# Patient Record
Sex: FEMALE | Race: WHITE
[De-identification: names, ages, dates, MRNs, and addresses within clinical notes are randomized per-mention and may not be internally consistent; named-entity substitution may affect disease eponyms.]

---

## 2017-07-19 ENCOUNTER — HOSPITAL ENCOUNTER (OUTPATIENT)
Dept: HOSPITAL 17 - HPND | Age: 27
End: 2017-07-19
Attending: OBSTETRICS & GYNECOLOGY
Payer: COMMERCIAL

## 2017-07-19 DIAGNOSIS — Z36: Primary | ICD-10-CM

## 2017-07-19 PROCEDURE — 76813 OB US NUCHAL MEAS 1 GEST: CPT

## 2017-07-19 PROCEDURE — 36416 COLLJ CAPILLARY BLOOD SPEC: CPT

## 2017-07-25 ENCOUNTER — HOSPITAL ENCOUNTER (EMERGENCY)
Dept: HOSPITAL 17 - NEPD | Age: 27
Discharge: HOME | End: 2017-07-25
Payer: COMMERCIAL

## 2017-07-25 VITALS — OXYGEN SATURATION: 100 % | HEART RATE: 91 BPM | RESPIRATION RATE: 20 BRPM

## 2017-07-25 VITALS
HEART RATE: 122 BPM | RESPIRATION RATE: 21 BRPM | TEMPERATURE: 97.8 F | SYSTOLIC BLOOD PRESSURE: 148 MMHG | DIASTOLIC BLOOD PRESSURE: 86 MMHG | OXYGEN SATURATION: 100 %

## 2017-07-25 VITALS
HEART RATE: 108 BPM | OXYGEN SATURATION: 100 % | DIASTOLIC BLOOD PRESSURE: 66 MMHG | RESPIRATION RATE: 20 BRPM | SYSTOLIC BLOOD PRESSURE: 119 MMHG

## 2017-07-25 VITALS — BODY MASS INDEX: 28.34 KG/M2 | WEIGHT: 176.37 LBS | HEIGHT: 66 IN

## 2017-07-25 DIAGNOSIS — F41.0: ICD-10-CM

## 2017-07-25 DIAGNOSIS — Z3A.14: ICD-10-CM

## 2017-07-25 DIAGNOSIS — Z79.899: ICD-10-CM

## 2017-07-25 DIAGNOSIS — O99.342: Primary | ICD-10-CM

## 2017-07-25 DIAGNOSIS — R06.02: ICD-10-CM

## 2017-07-25 PROCEDURE — 99283 EMERGENCY DEPT VISIT LOW MDM: CPT

## 2017-07-25 PROCEDURE — 93005 ELECTROCARDIOGRAM TRACING: CPT

## 2017-07-25 NOTE — PD
HPI


Chief Complaint:  Pregnancy Related Problem


Time Seen by Provider:  10:17


Travel History


International Travel<30 days:  No


Contact w/Intl Traveler<30days:  No


Traveled to known affect area:  No





History of Present Illness


HPI


28yo F who is [id number] pregnant presents to the ED with c/o episode of feeling 

her heart beat fast and sob yesterday that lasted an hour.  States it happened 

again today and she checked her home pulse ox and her oxygen was good at 97% 

but her heart rate was 120bpm.  Pt states it felt like a panic attack and that 

she has been feeling nervous about her baby since her miscarriage last 

November.  Pt currently denies any chest pain, sob, fever, cough, n/v, 

abdominal pain, vaginal bleeding, weakness or numbness.  Pt called her OB and 

was told to come to the ED.  Pt is saturating at 100% on RA and HR is in the 

80s.





PFSH


Past Medical History


Pregnant?:  Pregnant


LMP:  


:  2


Miscarriage:  1





Social History


Alcohol Use:  No


Tobacco Use:  No


Substance Use:  No





Allergies-Medications


(Allergen,Severity, Reaction):  


Coded Allergies:  


     No Known Allergies (Unverified , 17)


Reported Meds & Prescriptions





Reported Meds & Active Scripts


Active


Reported


Phenergan (Promethazine HCl) 25 Mg Tablet 25 Mg PO ONCE


Prenatal Plus Iron 29-1 mg (Prenatal Vit-Iron Carbonyl) 1 Tab Tab 1 Tab PO DAILY








Review of Systems


Except as stated in HPI:  all other systems reviewed are Neg





Physical Exam


Narrative


GENERAL: 28yo F not in distress.


SKIN: Focused skin assessment warm/dry.


HEAD: Atraumatic. Normocephalic. 


CARDIOVASCULAR: Regular rate and rhythm.  No murmur appreciated.  HR 85bpm.


RESPIRATORY: No accessory muscle use. Clear to auscultation. Breath sounds 

equal bilaterally. Saturating at 100% on RA.


GASTROINTESTINAL: Abdomen soft, non-tender.  No rebound tenderness or guarding.


MUSCULOSKELETAL: No obvious deformities. No clubbing.  No cyanosis.  No edema. 


NEUROLOGICAL: Awake and alert. No obvious cranial nerve deficits.  Motor 

grossly within normal limits. Normal speech.


PSYCHIATRIC: Appropriate mood and affect; insight and judgment normal.





Data


Data


Last Documented VS





Vital Signs








  Date Time  Temp Pulse Resp B/P Pulse Ox O2 Delivery O2 Flow Rate FiO2


 


17 11:41  91 20  100   


 


17 09:45    119/66  Room Air  


 


17 09:27 97.8       








Orders





 Electrocardiogram (17 )


Ed Poc Ultrasound (17 )








[facility]


Medical Decision Making


Medical Screen Exam Complete:  Yes


Emergency Medical Condition:  Yes


Interpretation(s)


EKG: NSR 84bpm.  Normal axis.  No ST segment elevation or depression.


Differential Diagnosis


Panic attack vs. anxiety vs. PE is unlikely


Narrative Course


28yo F with what sounds like a panic attack today.  Discussed the risk and 

benefit of CT angio to r/o PE and given the low suspicion, decided together not 

to do a CT angio.  Pt is requesting that I do a sonogram to make sure the baby 

is ok even though she has no abdominal pain or vaginal bleeding.





Procedures


**Procedure Narrative**


Emergency Department Pelvic ultrasound was performed with patient consent.


The curvilinear probe was used in the transverse and sagittal views within the 

suprapubic region revealing single intrauterine pregnancy.  Fetal heart rate 

was 146.





Diagnosis





 Primary Impression:  


 Panic attack


Patient Instructions:  General Instructions


Departure Forms:  Tests/Procedures





***Additional Instructions:


Please follow up with your OBGYN as an outpatient.  Return to the ED if you 

have any chest pain or sob.


***Med/Other Pt SpecificInfo:  No Change to Meds


Disposition:  01 DISCHARGE HOME


Condition:  Stable








Da SilvaJudy ONEILL 2017 10:35

## 2017-07-26 NOTE — EKG
Date Performed: 07/25/2017       Time Performed: 10:50:26

 

PTAGE:      27 years

 

EKG:      Sinus rhythm 

 

 NORMAL ECG 

 

NO PREVIOUS TRACING            

 

DOCTOR:   Memo Figueroa  Interpretating Date/Time  07/26/2017 09:59:31

## 2017-08-11 ENCOUNTER — HOSPITAL ENCOUNTER (EMERGENCY)
Dept: HOSPITAL 17 - HOBED | Age: 27
Discharge: HOME | End: 2017-08-11
Payer: COMMERCIAL

## 2017-08-11 DIAGNOSIS — Z3A.17: ICD-10-CM

## 2017-08-11 DIAGNOSIS — N89.8: ICD-10-CM

## 2017-08-11 DIAGNOSIS — O26.892: Primary | ICD-10-CM

## 2017-08-11 PROCEDURE — 99283 EMERGENCY DEPT VISIT LOW MDM: CPT

## 2017-08-11 PROCEDURE — 87210 SMEAR WET MOUNT SALINE/INK: CPT

## 2017-08-11 NOTE — PD
HPI


Chief Complaint


Leaking fluid, 17 weeks pregnant


Date Seen:  Aug 11, 2017


Time Seen:  11:45


Travel History


International Travel<30 Days:  No


Contact w/Intl Traveler<30Days:  No


Known Affected Area:  No





History of Present Illness


HPI


Pt is a 28 yo  who presents with c/o leaking fluid vaginally.


EDC is 2018.


Prenatal care with Dr Mei, prenatal previously uncomplicated.


Denies any fevers.


Denies any pain or contractions.


She states she noted staining, clear on her underwear last night, and this has 

continued till this morning.


Para:  0


:  2


Last Menstrual Period:  Aug 11, 2017


Miscarriage:  1


:  0





History


Past Medical History


*** Narrative Medical


Nil significant


Medical History:  Denies Significant Hx





Past Surgical History


Surgical History:  No Previous Surgery





Family History


Family History:  Negative





Social History


Alcohol Use:  No


Tobacco Use:  No


Substance Abuse:  No





Allergies-Medications


(Allergen,Severity, Reaction):  


Coded Allergies:  


     No Known Allergies (Unverified , 17)


Home Meds


Reported Medications


Promethazine (Phenergan)25 Mg Kipcin51 Mg PO ONCE   Ref 0


   17


Prenatal Vit-Iron Carbonyl (Prenatal Plus Iron 29-1 mg)1 Tab Tab1 Tab PO DAILY  

#30 TAB  Ref 0


   16





Review of Systems


Except as stated in HPI:  all other systems reviewed are Neg





Physical Exam


Narrative


GENERAL: Well-nourished, well-developed patient.


SKIN: Warm and dry.


HEAD: Normocephalic and atraumatic.


EYES: No scleral icterus. No injection or drainage. 


ENT: No nasal drainage noted. Mucous membranes pink. Airway patent.


NECK: Supple, trachea midline. No JVD.


CARDIOVASCULAR: Regular rate and rhythm without murmurs, gallops, or rubs. 


RESPIRATORY: Breath sounds equal bilaterally. No accessory muscle use.


BREASTS: Bilateral exam showed no masses , no retractions, no nipple discharge.


ABDOMEN/GI: Abdomen soft, non-tender, bowel sounds present, no rebound, no 

guarding 


   Gravid to [-] weeks size


   Fundal Height: [-]


GENITOURINARY: 


   External Genitalia: intact and normal in appearance


   BUS glands: [-]


   Cervix:  STERILE Speculum exam.


No pool of fluid.No leak with strain.


Thick yellowish discharge noted


Cervix appears closed


Swabs colled for Amniosure and for wet mount





   Dilatation: [-]          


   Effacement: [-]          


   Station: [-]  


   Presentation: [-]        


   Membranes: [intact]


   Uterine Contractions: [none]


FHT's: 


   Category: [-]   


   Baseline: [-]   


   Reactive: [-]   


   Variability: [-]  


   Decels: [-]  


EXTREMITIES: No cyanosis or edema.


BACK: Nontender without obvious deformity. No CVA tenderness.


NEUROLOGICAL: Awake and alert. Motor and sensory grossly within normal limits. 

Five out of 5 muscle strength in all muscle groups. Normal speech.





[facility]


Medical Record Reviewed:  Yes


Interpretation(s)


Amniosure NEGATIVE


Wet mount NEGATIVE


Results discussed with patient


Plan


Discharge home.


to call with any increased vaginal drainage, or bleeding


Diagnosis


Diagnosis:  


 Primary Impression:  


 Vaginal discharge during pregnancy in first trimester


Disposition:  01 DISCHARGE HOME


Condition:  Good








Jesus Alberto Andre MD Aug 11, 2017 11:29

## 2018-01-12 ENCOUNTER — HOSPITAL ENCOUNTER (EMERGENCY)
Dept: HOSPITAL 17 - HOBED | Age: 28
LOS: 1 days | Discharge: HOME | End: 2018-01-13
Payer: COMMERCIAL

## 2018-01-12 DIAGNOSIS — Z3A.38: ICD-10-CM

## 2018-01-12 DIAGNOSIS — O47.1: Primary | ICD-10-CM

## 2018-01-12 PROCEDURE — 59025 FETAL NON-STRESS TEST: CPT

## 2018-01-12 NOTE — PD
HPI


Chief Complaint


38 weeks and 1 day pregnant


Uterine contractions 1 day


Date Seen:  2018


Time Seen:  23:25


Travel History


International Travel<30 Days:  No


Contact w/Intl Traveler<30Days:  No


Known Affected Area:  No





History of Present Illness


HPI


Pt is a 26 yo  at 38 weeks and 1 day


EDC 2018, prenatal care with dr Mei.


Care previously uncomplicated.





Patient reports uterine contractions 1 day


Prior to presenting to ER, contractions were about 5 minutes apart


Active fetal movements.


No vaginal bleeding.


Patient was 3cm dilated in office yesterday.


Weeks Gestation:  38


Para:  0


:  1


Last Menstrual Period:  Aug 11, 2017


Miscarriage:  1





History


Past Medical History


Medical History:  Denies Significant Hx





Obstetric History


Obstetric History


previous 1st trimester miscarriage





Family History


Family History:  Negative





Social History


Alcohol Use:  No


Tobacco Use:  No


Substance Abuse:  No





Allergies-Medications


(Allergen,Severity, Reaction):  


Coded Allergies:  


     No Known Allergies (Unverified , 17)


Home Meds


Reported Medications


Promethazine (Phenergan) 25 Mg Tablet, 25 MG PO ONCE for Nausea/Vomiting, TAB 0 

Refills


   17


Prenatal Vit-Iron Carbonyl (Prenatal Plus Iron 29-1 mg) 1 Tab Tab, 1 TAB PO 

DAILY for Nutritional Supplement, #30 TAB 0 Refills


   16





Review of Systems


Except as stated in HPI:  all other systems reviewed are Neg





Physical Exam


Narrative


GENERAL: Well-nourished, well-developed patient.


SKIN: Warm and dry.


HEAD: Normocephalic and atraumatic.


EYES: No scleral icterus. No injection or drainage. 


ENT: No nasal drainage noted. Mucous membranes pink. Airway patent.


NECK: Supple, trachea midline. No JVD.


CARDIOVASCULAR: Regular rate and rhythm without murmurs, gallops, or rubs. 


RESPIRATORY: Breath sounds equal bilaterally. No accessory muscle use.


BREASTS: Bilateral exam showed no masses , no retractions, no nipple discharge.


ABDOMEN/GI: Abdomen soft, non-tender, bowel sounds present, no rebound, no 

guarding 


   Gravid to [38] weeks size


   Fundal Height: [38]


GENITOURINARY: 


   External Genitalia: intact and normal in appearance


   BUS glands: [wnl]


   Cervix: [moderate]


   Dilatation: [2-3cm]  posterior        


   Effacement: [50%]          


   Station: [-3]  


   Presentation: [vertex]        


   Membranes: [intact]


   Uterine Contractions: [10 minutes]


FHT's: 


   Category: [1]   


   Baseline: [130s]   


   Reactive: [-]   


   Variability: [moderate]  


   Decels: [none]  


EXTREMITIES: No cyanosis or edema.


BACK: Nontender without obvious deformity. No CVA tenderness.


NEUROLOGICAL: Awake and alert. Motor and sensory grossly within normal limits. 

Five out of 5 muscle strength in all muscle groups. Normal speech.





Data


Data


Vital Signs Reviewed:  Yes





[facility]


Medical Record Reviewed:  Yes


Plan


26 yo  at 38 weeks and 1 day IUP.


Presents with uterine contractions every 5 minutes for about 1 hour at home.


Contractions now spacing out.


Unchanged from office exam.


Fetal status reassuring


Will recheck in 1 hour.





Cervix unchanged after 1 hour.


Patient declines offer of PO Vistaril.


Labor precautions emphasized.


Diagnosis


Diagnosis:  


 Primary Impression:  


 Pregnancy with 38 completed weeks gestation


 Additional Impression:  


 False labor after 37 weeks of gestation without delivery


Disposition:  01 DISCHARGE HOME


Condition:  Good











Jesus Alberto Andre MD 2018 23:14

## 2018-01-16 ENCOUNTER — HOSPITAL ENCOUNTER (EMERGENCY)
Dept: HOSPITAL 17 - HOBED | Age: 28
LOS: 1 days | Discharge: HOME | End: 2018-01-17
Payer: COMMERCIAL

## 2018-01-16 DIAGNOSIS — O47.1: Primary | ICD-10-CM

## 2018-01-16 DIAGNOSIS — Z3A.39: ICD-10-CM

## 2018-01-16 PROCEDURE — 99283 EMERGENCY DEPT VISIT LOW MDM: CPT

## 2018-01-16 PROCEDURE — 84112 EVAL AMNIOTIC FLUID PROTEIN: CPT

## 2018-01-17 NOTE — PD
HPI


Chief Complaint


Contractions


Date Seen:  2018


Time Seen:  00:08


Travel History


International Travel<30 Days:  No


Contact w/Intl Traveler<30Days:  No


Known Affected Area:  No





History of Present Illness


HPI


27-year-old  who is at 39 weeks 1 day comes in with contractions since 8 

PM.  They are irregular and patient states that she's had contractions similar 

to this episode several times and has become somewhat frustrating.  Patient was 

in here last week with contractions and she was noted to be 3 cm but did not 

change and in the office last Thursday she was still 3 cm.  Patient had some 

watery vaginal discharge and she also wanted to ensure that she did not rupture 

her membranes.  Patient denies any antepartum complications





History


Past Medical History


Medical History:  Denies Significant Hx





Family History


Family History:  Negative





Social History


Alcohol Use:  No


Tobacco Use:  No


Substance Abuse:  No





Allergies-Medications


(Allergen,Severity, Reaction):  


Coded Allergies:  


     No Known Allergies (Unverified , 17)


Home Meds


Reported Medications


Promethazine (Phenergan) 25 Mg Tablet, 25 MG PO ONCE for Nausea/Vomiting, TAB 0 

Refills


   17


Prenatal Vit-Iron Carbonyl (Prenatal Plus Iron 29-1 mg) 1 Tab Tab, 1 TAB PO 

DAILY for Nutritional Supplement, #30 TAB 0 Refills


   16





Review of Systems


Except as stated in HPI:  all other systems reviewed are Neg





Physical Exam


Narrative


GENERAL: Well-nourished, well-developed patient.


SKIN: Warm and dry.


HEAD: Normocephalic and atraumatic.


EYES: No scleral icterus. No injection or drainage. 


ENT: No nasal drainage noted. Mucous membranes pink. Airway patent.


NECK: Supple, trachea midline. No JVD.


CARDIOVASCULAR: Regular rate and rhythm without murmurs, gallops, or rubs. 


RESPIRATORY: Breath sounds equal bilaterally. No accessory muscle use.


ABDOMEN/GI: Abdomen soft, non-tender, bowel sounds present, no rebound, no 

guarding 


   Gravid to [38-] weeks size


   Fundal Height: [-]


GENITOURINARY: 


   External Genitalia: intact and normal in appearance


   BUS glands: [-] Normal


   Cervix: [-] Posterior


   Dilatation: [-] 3         


   Effacement: [-] 50          


   Station: [-] -2 


   Presentation: [-] Vertex        


   Membranes: [intact or ruptured] intact with negative amnisure


   Uterine Contractions: [-] Irregular every 8-10


FHT's: 


   Category: [-] 1   


   Baseline: [-] 140   


   Reactive: [-] Moderate  


   Variability: [-] Moderate  


   Decels: [-] Absent 


EXTREMITIES: No cyanosis or edema.


BACK: Nontender without obvious deformity. No CVA tenderness.


NEUROLOGICAL: Awake and alert. Motor and sensory grossly within normal limits. 

Five out of 5 muscle strength in all muscle groups. Normal speech.





Data


Data


Vital Signs Reviewed:  Yes





[facility]


Medical Record Reviewed:  Yes


Plan


27-year-old  at 39 weeks 1 day with false labor and no cervical change 

since last week


Amnisure negative


Follow-up with OB provider as scheduled


Diagnosis


Diagnosis:  


 Primary Impression:  


 39 weeks gestation of pregnancy


 Additional Impression:  


 False labor after 37 completed weeks of gestation


Disposition:  01 DISCHARGE HOME











Kaycee Davila MD 2018 00:12

## 2018-01-18 ENCOUNTER — HOSPITAL ENCOUNTER (INPATIENT)
Dept: HOSPITAL 17 - HOBED | Age: 28
LOS: 2 days | Discharge: HOME | End: 2018-01-20
Attending: OBSTETRICS & GYNECOLOGY | Admitting: OBSTETRICS & GYNECOLOGY
Payer: COMMERCIAL

## 2018-01-18 VITALS — HEART RATE: 78 BPM

## 2018-01-18 VITALS — SYSTOLIC BLOOD PRESSURE: 115 MMHG | DIASTOLIC BLOOD PRESSURE: 70 MMHG | HEART RATE: 87 BPM

## 2018-01-18 VITALS — DIASTOLIC BLOOD PRESSURE: 69 MMHG | HEART RATE: 82 BPM | SYSTOLIC BLOOD PRESSURE: 114 MMHG

## 2018-01-18 VITALS — DIASTOLIC BLOOD PRESSURE: 75 MMHG | SYSTOLIC BLOOD PRESSURE: 114 MMHG | HEART RATE: 95 BPM

## 2018-01-18 VITALS — SYSTOLIC BLOOD PRESSURE: 107 MMHG | HEART RATE: 93 BPM | DIASTOLIC BLOOD PRESSURE: 73 MMHG

## 2018-01-18 VITALS — HEART RATE: 92 BPM | OXYGEN SATURATION: 100 %

## 2018-01-18 VITALS — RESPIRATION RATE: 15 BRPM

## 2018-01-18 VITALS — SYSTOLIC BLOOD PRESSURE: 111 MMHG | HEART RATE: 86 BPM | DIASTOLIC BLOOD PRESSURE: 69 MMHG

## 2018-01-18 VITALS — HEART RATE: 93 BPM

## 2018-01-18 VITALS — HEART RATE: 85 BPM | DIASTOLIC BLOOD PRESSURE: 75 MMHG | SYSTOLIC BLOOD PRESSURE: 111 MMHG

## 2018-01-18 VITALS — HEART RATE: 94 BPM | DIASTOLIC BLOOD PRESSURE: 74 MMHG | SYSTOLIC BLOOD PRESSURE: 109 MMHG

## 2018-01-18 VITALS — HEART RATE: 84 BPM

## 2018-01-18 VITALS — HEART RATE: 75 BPM

## 2018-01-18 VITALS — HEART RATE: 89 BPM | DIASTOLIC BLOOD PRESSURE: 67 MMHG | SYSTOLIC BLOOD PRESSURE: 110 MMHG

## 2018-01-18 VITALS — RESPIRATION RATE: 15 BRPM | TEMPERATURE: 97.7 F

## 2018-01-18 VITALS — SYSTOLIC BLOOD PRESSURE: 111 MMHG | DIASTOLIC BLOOD PRESSURE: 60 MMHG | HEART RATE: 84 BPM

## 2018-01-18 VITALS — HEART RATE: 89 BPM

## 2018-01-18 VITALS — SYSTOLIC BLOOD PRESSURE: 111 MMHG | HEART RATE: 85 BPM | DIASTOLIC BLOOD PRESSURE: 66 MMHG

## 2018-01-18 VITALS — SYSTOLIC BLOOD PRESSURE: 114 MMHG | HEART RATE: 93 BPM | DIASTOLIC BLOOD PRESSURE: 67 MMHG

## 2018-01-18 VITALS — HEART RATE: 91 BPM | OXYGEN SATURATION: 100 %

## 2018-01-18 VITALS — HEIGHT: 66 IN | BODY MASS INDEX: 32.6 KG/M2 | WEIGHT: 202.83 LBS

## 2018-01-18 VITALS — OXYGEN SATURATION: 100 % | HEART RATE: 102 BPM

## 2018-01-18 VITALS — HEART RATE: 82 BPM | SYSTOLIC BLOOD PRESSURE: 107 MMHG | DIASTOLIC BLOOD PRESSURE: 66 MMHG

## 2018-01-18 VITALS — HEART RATE: 76 BPM

## 2018-01-18 VITALS — HEART RATE: 86 BPM | DIASTOLIC BLOOD PRESSURE: 68 MMHG | SYSTOLIC BLOOD PRESSURE: 111 MMHG

## 2018-01-18 VITALS — SYSTOLIC BLOOD PRESSURE: 113 MMHG | HEART RATE: 85 BPM | DIASTOLIC BLOOD PRESSURE: 71 MMHG

## 2018-01-18 VITALS — HEART RATE: 89 BPM | OXYGEN SATURATION: 100 %

## 2018-01-18 VITALS — HEART RATE: 97 BPM | DIASTOLIC BLOOD PRESSURE: 71 MMHG | SYSTOLIC BLOOD PRESSURE: 113 MMHG

## 2018-01-18 VITALS — SYSTOLIC BLOOD PRESSURE: 117 MMHG | HEART RATE: 75 BPM | DIASTOLIC BLOOD PRESSURE: 64 MMHG

## 2018-01-18 VITALS — HEART RATE: 112 BPM | OXYGEN SATURATION: 100 %

## 2018-01-18 VITALS — HEART RATE: 95 BPM | SYSTOLIC BLOOD PRESSURE: 106 MMHG | DIASTOLIC BLOOD PRESSURE: 60 MMHG

## 2018-01-18 VITALS — OXYGEN SATURATION: 100 % | HEART RATE: 97 BPM

## 2018-01-18 VITALS — HEART RATE: 83 BPM | OXYGEN SATURATION: 100 %

## 2018-01-18 VITALS — DIASTOLIC BLOOD PRESSURE: 76 MMHG | HEART RATE: 89 BPM | OXYGEN SATURATION: 100 % | SYSTOLIC BLOOD PRESSURE: 119 MMHG

## 2018-01-18 VITALS — HEART RATE: 82 BPM

## 2018-01-18 VITALS
SYSTOLIC BLOOD PRESSURE: 117 MMHG | DIASTOLIC BLOOD PRESSURE: 66 MMHG | RESPIRATION RATE: 20 BRPM | HEART RATE: 90 BPM | TEMPERATURE: 97.5 F

## 2018-01-18 VITALS — TEMPERATURE: 98 F | RESPIRATION RATE: 18 BRPM

## 2018-01-18 VITALS — DIASTOLIC BLOOD PRESSURE: 70 MMHG | HEART RATE: 86 BPM | SYSTOLIC BLOOD PRESSURE: 115 MMHG

## 2018-01-18 VITALS — RESPIRATION RATE: 18 BRPM | TEMPERATURE: 98.7 F

## 2018-01-18 VITALS — HEART RATE: 78 BPM | SYSTOLIC BLOOD PRESSURE: 114 MMHG | DIASTOLIC BLOOD PRESSURE: 69 MMHG

## 2018-01-18 VITALS — HEART RATE: 88 BPM | SYSTOLIC BLOOD PRESSURE: 114 MMHG | DIASTOLIC BLOOD PRESSURE: 74 MMHG

## 2018-01-18 VITALS — SYSTOLIC BLOOD PRESSURE: 109 MMHG | HEART RATE: 86 BPM | DIASTOLIC BLOOD PRESSURE: 72 MMHG | OXYGEN SATURATION: 100 %

## 2018-01-18 VITALS — OXYGEN SATURATION: 100 % | HEART RATE: 88 BPM

## 2018-01-18 VITALS — HEART RATE: 90 BPM

## 2018-01-18 VITALS — RESPIRATION RATE: 14 BRPM

## 2018-01-18 VITALS — SYSTOLIC BLOOD PRESSURE: 108 MMHG | DIASTOLIC BLOOD PRESSURE: 69 MMHG | HEART RATE: 86 BPM

## 2018-01-18 VITALS — HEART RATE: 83 BPM

## 2018-01-18 VITALS — OXYGEN SATURATION: 100 % | HEART RATE: 77 BPM

## 2018-01-18 VITALS — RESPIRATION RATE: 19 BRPM

## 2018-01-18 VITALS — SYSTOLIC BLOOD PRESSURE: 111 MMHG | DIASTOLIC BLOOD PRESSURE: 71 MMHG | HEART RATE: 83 BPM

## 2018-01-18 VITALS — DIASTOLIC BLOOD PRESSURE: 69 MMHG | SYSTOLIC BLOOD PRESSURE: 117 MMHG | HEART RATE: 86 BPM

## 2018-01-18 VITALS — SYSTOLIC BLOOD PRESSURE: 121 MMHG | HEART RATE: 81 BPM | DIASTOLIC BLOOD PRESSURE: 67 MMHG

## 2018-01-18 VITALS — HEART RATE: 87 BPM

## 2018-01-18 VITALS — HEART RATE: 74 BPM | DIASTOLIC BLOOD PRESSURE: 62 MMHG | SYSTOLIC BLOOD PRESSURE: 114 MMHG

## 2018-01-18 VITALS — HEART RATE: 79 BPM

## 2018-01-18 VITALS — SYSTOLIC BLOOD PRESSURE: 136 MMHG | HEART RATE: 111 BPM | DIASTOLIC BLOOD PRESSURE: 70 MMHG

## 2018-01-18 VITALS — OXYGEN SATURATION: 100 % | HEART RATE: 90 BPM

## 2018-01-18 VITALS — SYSTOLIC BLOOD PRESSURE: 117 MMHG | DIASTOLIC BLOOD PRESSURE: 73 MMHG | HEART RATE: 90 BPM

## 2018-01-18 VITALS — DIASTOLIC BLOOD PRESSURE: 73 MMHG | SYSTOLIC BLOOD PRESSURE: 110 MMHG | HEART RATE: 84 BPM

## 2018-01-18 VITALS — RESPIRATION RATE: 18 BRPM

## 2018-01-18 VITALS — DIASTOLIC BLOOD PRESSURE: 67 MMHG | SYSTOLIC BLOOD PRESSURE: 96 MMHG | HEART RATE: 85 BPM

## 2018-01-18 VITALS — TEMPERATURE: 98.3 F | RESPIRATION RATE: 18 BRPM

## 2018-01-18 VITALS — HEART RATE: 74 BPM

## 2018-01-18 VITALS — DIASTOLIC BLOOD PRESSURE: 69 MMHG | SYSTOLIC BLOOD PRESSURE: 111 MMHG | HEART RATE: 95 BPM

## 2018-01-18 DIAGNOSIS — O47.1: ICD-10-CM

## 2018-01-18 DIAGNOSIS — Z3A.39: ICD-10-CM

## 2018-01-18 LAB
BASOPHILS # BLD AUTO: 0 TH/MM3 (ref 0–0.2)
BASOPHILS NFR BLD: 0.2 % (ref 0–2)
COLOR UR: YELLOW
EOSINOPHIL # BLD: 0 TH/MM3 (ref 0–0.4)
EOSINOPHIL NFR BLD: 0.3 % (ref 0–4)
ERYTHROCYTE [DISTWIDTH] IN BLOOD BY AUTOMATED COUNT: 15.8 % (ref 11.6–17.2)
GLUCOSE UR STRIP-MCNC: (no result) MG/DL
HCT VFR BLD CALC: 34.5 % (ref 35–46)
HGB BLD-MCNC: 11.4 GM/DL (ref 11.6–15.3)
HGB UR QL STRIP: (no result)
KETONES UR STRIP-MCNC: (no result) MG/DL
LYMPHOCYTES # BLD AUTO: 1.7 TH/MM3 (ref 1–4.8)
LYMPHOCYTES NFR BLD AUTO: 16.1 % (ref 9–44)
MCH RBC QN AUTO: 26.1 PG (ref 27–34)
MCHC RBC AUTO-ENTMCNC: 33 % (ref 32–36)
MCV RBC AUTO: 79.2 FL (ref 80–100)
MONOCYTE #: 0.6 TH/MM3 (ref 0–0.9)
MONOCYTES NFR BLD: 5.4 % (ref 0–8)
MUCOUS THREADS #/AREA URNS LPF: (no result) /LPF
NEUTROPHILS # BLD AUTO: 8.3 TH/MM3 (ref 1.8–7.7)
NEUTROPHILS NFR BLD AUTO: 78 % (ref 16–70)
NITRITE UR QL STRIP: (no result)
PLATELET # BLD: 316 TH/MM3 (ref 150–450)
PMV BLD AUTO: 8.1 FL (ref 7–11)
RBC # BLD AUTO: 4.36 MIL/MM3 (ref 4–5.3)
SP GR UR STRIP: 1.01 (ref 1–1.03)
SQUAMOUS #/AREA URNS HPF: 2 /HPF (ref 0–5)
URINE LEUKOCYTE ESTERASE: (no result)
WBC # BLD AUTO: 10.6 TH/MM3 (ref 4–11)

## 2018-01-18 PROCEDURE — 0UQGXZZ REPAIR VAGINA, EXTERNAL APPROACH: ICD-10-PCS | Performed by: OBSTETRICS & GYNECOLOGY

## 2018-01-18 PROCEDURE — 00HU33Z INSERTION OF INFUSION DEVICE INTO SPINAL CANAL, PERCUTANEOUS APPROACH: ICD-10-PCS

## 2018-01-18 PROCEDURE — 90715 TDAP VACCINE 7 YRS/> IM: CPT

## 2018-01-18 PROCEDURE — 86900 BLOOD TYPING SEROLOGIC ABO: CPT

## 2018-01-18 PROCEDURE — 85025 COMPLETE CBC W/AUTO DIFF WBC: CPT

## 2018-01-18 PROCEDURE — 86901 BLOOD TYPING SEROLOGIC RH(D): CPT

## 2018-01-18 PROCEDURE — 81001 URINALYSIS AUTO W/SCOPE: CPT

## 2018-01-18 PROCEDURE — 84112 EVAL AMNIOTIC FLUID PROTEIN: CPT

## 2018-01-18 PROCEDURE — 59025 FETAL NON-STRESS TEST: CPT

## 2018-01-18 PROCEDURE — 80307 DRUG TEST PRSMV CHEM ANLYZR: CPT

## 2018-01-18 PROCEDURE — 3E0R3BZ INTRODUCTION OF ANESTHETIC AGENT INTO SPINAL CANAL, PERCUTANEOUS APPROACH: ICD-10-PCS

## 2018-01-18 RX ADMIN — OXYTOCIN SCH MLS/HR: 10 INJECTION, SOLUTION INTRAMUSCULAR; INTRAVENOUS at 11:09

## 2018-01-18 RX ADMIN — OXYTOCIN SCH MLS/HR: 10 INJECTION, SOLUTION INTRAMUSCULAR; INTRAVENOUS at 19:11

## 2018-01-18 RX ADMIN — Medication SCH ML: at 21:11

## 2018-01-18 NOTE — PD
HPI


Chief Complaint


LOF


Travel History


International Travel<30 Days:  No


Contact w/Intl Traveler<30Days:  No


Known Affected Area:  No





History of Present Illness


HPI


28y/o , IUP at 39.2





PNC uncomplicated per patient report





Patient presents c/o gush of clear fluid at 8:30am followed by another gush of 

fluid after arrival in the parking lot and continues to leak since arriving. 

She reports irregular contractions but denies regular or painful retractions. 

She reports good FM. She denies any VB. She has no other complaints or concerns 

today.


Weeks Gestation:  39


Para:  0


:  2





History


Past Medical History


Medical History:  Denies Significant Hx





Obstetric History


Obstetric History


, SAB 1





Past Surgical History


Surgical History:  No Previous Surgery





Family History


*** Narrative Family History


Hypothyroidism, CVA





Social History


Alcohol Use:  No


Tobacco Use:  No


Substance Abuse:  No





Allergies-Medications


(Allergen,Severity, Reaction):  


Coded Allergies:  


     No Known Allergies (Unverified , 17)


Home Meds


Reported Medications


Promethazine (Phenergan) 25 Mg Tablet, 25 MG PO ONCE for Nausea/Vomiting, TAB 0 

Refills


   17


Prenatal Vit-Iron Carbonyl (Prenatal Plus Iron 29-1 mg) 1 Tab Tab, 1 TAB PO 

DAILY for Nutritional Supplement, #30 TAB 0 Refills


   16





Review of Systems


Except as stated in HPI:  all other systems reviewed are Neg





Physical Exam


Narrative


GENERAL: Well-nourished, well-developed patient.


SKIN: Warm and dry.


HEAD: Normocephalic and atraumatic.


EYES: No scleral icterus. No injection or drainage. 


ENT: No nasal drainage noted. Mucous membranes pink. Airway patent.


NECK: Supple, trachea midline. No JVD.


CARDIOVASCULAR: Regular rate and rhythm without murmurs, gallops, or rubs. 


RESPIRATORY: Breath sounds equal bilaterally. No accessory muscle use.


BREASTS: Deferred


ABDOMEN/GI: Abdomen soft, non-tender, bowel sounds present, no rebound, no 

guarding 


   Gravid 


GENITOURINARY: 


   External Genitalia: intact and normal in appearance.  Grossly normal BUS, 

grossly normal rugae.  Patient appears to have grossly ruptured membranes.  SVE 

4-5/70/-1 as per RN.  Cephalic as per RN


FHT's: Fetal heart tones are in the 140s with moderate long-term variability, 

good accelerations, no decelerations.  This is a category 1 fetal heart rate 

tracing and reactive NST


EXTREMITIES: No cyanosis or edema.


BACK: Nontender without obvious deformity. 


NEUROLOGICAL: Awake and alert. Motor and sensory grossly within normal limits. 

Five out of 5 muscle strength in all muscle groups. Normal speech.


Psychiatric: Grossly normal memory and affect


Muscle skeletal: Grossly normal range of motion, gait, muscle strength





Data


Data


Orders





 Orders


Ob (2e) Additional Admit Info (18 10:25)





MDM


Narrative Course / MDM


Assessment/plan: 27-year-old 


1.  IUP at 39.2


2.  SROM: The patient is grossly ruptured membranes and may be entering labor 

with a cervical exam of 4 cm.  Dr. Lopez was notified and the patient will be 

admitted to Dr. Mei.  Initial labor orders were placed and discussed in brief 

the patient risks of /risks and indications for  delivery.


3.  Fetal well-being: Reassuring  testing with category 1 fetal heart 

rate tracing and reactive NST


4.  GBS negative











Smiley Amato MD 2018 10:30

## 2018-01-18 NOTE — PD.LABORPN
Subjective


Subjective


doing well





Objective


Vital Signs





Vital Signs








  Date Time  Temp Pulse Resp B/P (MAP) Pulse Ox O2 Delivery O2 Flow Rate FiO2


 


1/18/18 17:25  74      


 


1/18/18 17:20  75      


 


1/18/18 17:15  83      


 


1/18/18 17:10  75      


 


1/18/18 17:05  86  115/70 (85)    


 


1/18/18 17:05  72      


 


1/18/18 17:00  84      


 


1/18/18 16:55  85  96/67 (77)    


 


1/18/18 16:55  82      


 


1/18/18 16:51  74  114/62 (79)    


 


1/18/18 16:50  90      


 


1/18/18 16:45  75      


 


1/18/18 16:45  87  117/64 (81)    


 


1/18/18 16:40  85  111/66 (81)    


 


1/18/18 16:40  85      


 


1/18/18 16:35  95  111/69 (83)    


 


1/18/18 16:35  93      


 


1/18/18 16:30  87  115/70 (85)    


 


1/18/18 16:30  88      


 


1/18/18 16:25  86  117/73 (88)    


 


1/18/18 16:25  90      


 


1/18/18 16:20  85  106/60 (75)    


 


1/18/18 16:20  95      


 


1/18/18 16:19  84  110/73 (85)    


 


1/18/18 16:15  89      


 


1/18/18 16:05   18     


 


1/18/18 16:00  83  111/71 (84)    


 


1/18/18 15:30  88  114/74 (87)    


 


1/18/18 15:00  94  109/74 (86)    


 


1/18/18 14:30  85  111/75 (87)    


 


1/18/18 14:00  82  114/69 (84)    


 


1/18/18 13:43 98.7  18     


 


1/18/18 13:30  85  113/71 (85)    


 


1/18/18 13:00  86  111/68 (82)    


 


1/18/18 12:30  95  114/75 (88)    


 


1/18/18 12:00  89  110/67 (81)    


 


1/18/18 11:30  93  107/73 (84)    


 


1/18/18 10:47  86  117/69 (85)    








Objective


Pelvic Exam:   


   Cervix: [-]


   Dilatation: 5          


   Effacement: [-]          


   Station: [-]  


   Presentation: [-]        


   Membranes: SROM


   Uterine Contractions: [-]


FHT's: 


   Category: 1   


   Baseline: [-]   


   Reactive: [-]   


   Variability: [-]  


   Decels: [-]


Weeks Gestation:  39


Gest Age Assessed Date:  Jan 18, 2018


Pt started active labor?:  Yes


Active labor start date:  Jan 18, 2018


Active labor start time:  08:30


Medical induction of labor?:  No


Artificial rupture of membrane:  No





Assessment/Plan


Problem List:  


(1) 39 weeks gestation of pregnancy


ICD Codes:  Z3A.39 - 39 weeks gestation of pregnancy


Assessment and Plan


doing well











Tan Lopez MD Jan 18, 2018 17:43

## 2018-01-18 NOTE — PD.LABORPN
Subjective


Subjective


doing well no CTX





Objective


Vital Signs





Vital Signs








  Date Time  Temp Pulse Resp B/P (MAP) Pulse Ox O2 Delivery O2 Flow Rate FiO2


 


1/18/18 12:30  95  114/75 (88)    


 


1/18/18 12:00  89  110/67 (81)    


 


1/18/18 11:30  93  107/73 (84)    


 


1/18/18 10:47  86  117/69 (85)    








Objective


Pelvic Exam:   


   Cervix: [-]


   Dilatation: 4-5        


   Effacement: [-]          


   Station: [-]  


   Presentation: [-]        


   Membranes: SROM at 8:30


   Uterine Contractions: [-]


FHT's: 


   Category: 1  


   Baseline: [-]   


   Reactive: [-]   


   Variability: [-]  


   Decels: [-]


Weeks Gestation:  39





Assessment/Plan


Problem List:  


(1) 39 weeks gestation of pregnancy


ICD Codes:  Z3A.39 - 39 weeks gestation of pregnancy











Tan Lopez MD Jan 18, 2018 13:19

## 2018-01-18 NOTE — PD.OB.DELI
Weeks gestation:  39


Gest age assessed date:  2018


Pt started active labor?:  Yes


Active labor start date:  2018


Active labor start time:  08:30


Medical induction of labor?:  No


Artificial rupture of membrane:  No


Anesthesia:  Epidural


Episiotomy:  None


Vaginal Delivery:  Normal, Spontaneous


Presentation:  Occiput anterior


Nuchal Cord:  None


Delayed cord clamping (45 sec):  Yes


Infant:  Male, Single


Delivery date:  2018


Delivery time:  20:10


One Minute APGAR:  8


Five Minute APGAR:  9


Birth Weight:  7# 14 oz


Placenta:  Spontaneous delivery, Intact, 3 vessel cord


Laceration:  Vaginal laceration, 1 deg


Repair:  Chromic interrupted


Estimated blood loss:  300











Tan Lopez MD 2018 20:33

## 2018-01-18 NOTE — HHI.DCPOC
Discharge Care Plan


Diagnosis:  


(1) Spontaneous vaginal delivery


Report Symptoms to Your Doctor


-Temperature above 100.5 degrees


-Redness, of incision or excessive or foul smelling drainage


-Unusual pain or calf pain


-Increased vaginal bleeding


-Painful or difficulty urinating


-Feelings of extreme sadness or anxiety after 2 weeks


Goals to Promote Your Health


* To prevent worsening of your condition and complications


* To maintain your health at the optimal level


Directions to Meet Your Goals


*** Take your medications as prescribed


*** Follow your dietary instruction


*** Follow activity as directed


*** Ensure plenty of rest for recovery


*** Drink fluids for hydration








*** Keep your appointments as scheduled


*** Take your immunizations and boosters as scheduled


*** If your symptoms worsen call your PCP, if no PCP go to Urgent Care Center 

or Emergency Room***


*** Smoking is Dangerous to Your Health. Avoid second hand smoke***


***Call the 24-hour crisis hotline for domestic abuse at 1-808.221.5675***











Tan Lopez MD Jan 18, 2018 20:41

## 2018-01-19 VITALS
OXYGEN SATURATION: 98 % | TEMPERATURE: 97.6 F | DIASTOLIC BLOOD PRESSURE: 60 MMHG | RESPIRATION RATE: 16 BRPM | SYSTOLIC BLOOD PRESSURE: 99 MMHG | HEART RATE: 94 BPM

## 2018-01-19 VITALS
SYSTOLIC BLOOD PRESSURE: 101 MMHG | DIASTOLIC BLOOD PRESSURE: 69 MMHG | OXYGEN SATURATION: 97 % | TEMPERATURE: 98 F | HEART RATE: 87 BPM | RESPIRATION RATE: 18 BRPM

## 2018-01-19 VITALS
TEMPERATURE: 97.7 F | DIASTOLIC BLOOD PRESSURE: 59 MMHG | RESPIRATION RATE: 18 BRPM | SYSTOLIC BLOOD PRESSURE: 102 MMHG | HEART RATE: 82 BPM

## 2018-01-19 RX ADMIN — IBUPROFEN PRN MG: 800 TABLET, FILM COATED ORAL at 08:46

## 2018-01-19 RX ADMIN — IBUPROFEN PRN MG: 800 TABLET, FILM COATED ORAL at 16:23

## 2018-01-19 RX ADMIN — Medication SCH ML: at 16:13

## 2018-01-19 NOTE — HHI.OB
Subjective


Post Partum Day:  1


Remarks


doing well





Objective


Vitals/I&O





Vital Signs








  Date Time  Temp Pulse Resp B/P (MAP) Pulse Ox O2 Delivery O2 Flow Rate FiO2


 


1/19/18 02:56 97.7 82 18 102/59 (73)    


 


1/18/18 22:45 97.5 90 20 117/66 (83)    


 


1/18/18 22:00  86  111/69 (83)    


 


1/18/18 21:45  86  108/69 (82)    


 


1/18/18 21:30  84  111/60 (77)    


 


1/18/18 21:25   15     


 


1/18/18 21:09   15     


 


1/18/18 21:00  93  114/67 (83)    


 


1/18/18 20:49   14     


 


1/18/18 20:33 98.3  18     


 


1/18/18 20:30  97  113/71 (85)    


 


1/18/18 20:15   19     


 


1/18/18 20:01  111  136/70 (92)    


 


1/18/18 19:45  92   100   


 


1/18/18 19:40  112   100   


 


1/18/18 19:35  102      


 


1/18/18 19:35     100   


 


1/18/18 19:30  86      


 


1/18/18 19:30     100   


 


1/18/18 19:30  97  109/72 (84)    


 


1/18/18 19:25  90   100   


 


1/18/18 19:20  89   100   


 


1/18/18 19:15  97   100   


 


1/18/18 19:10  89   100   


 


1/18/18 19:05  91      


 


1/18/18 19:05     100   


 


1/18/18 19:01 97.7  15     


 


1/18/18 19:00  89      


 


1/18/18 19:00  93  119/76 (90)    


 


1/18/18 19:00     100   


 


1/18/18 18:55  83   100   


 


1/18/18 18:50  88   100   


 


1/18/18 18:45     100   


 


1/18/18 18:45  77      


 


1/18/18 18:40  82      


 


1/18/18 18:35  84      


 


1/18/18 18:30  82  107/66 (80)    


 


1/18/18 18:30  80      


 


1/18/18 18:25  93      


 


1/18/18 18:20  84      


 


1/18/18 18:15  93      


 


1/18/18 18:10  87      


 


1/18/18 18:05  87      


 


1/18/18 18:01   18     


 


1/18/18 18:00  77  114/69 (84)    


 


1/18/18 18:00  78      


 


1/18/18 17:55  79      


 


1/18/18 17:50  76      


 


1/18/18 17:45  76      


 


1/18/18 17:40  78      


 


1/18/18 17:38   18     


 


1/18/18 17:38 98.0       


 


1/18/18 17:35  78      


 


1/18/18 17:31  81  121/67 (85)    


 


1/18/18 17:30  78      


 


1/18/18 17:25  74      


 


1/18/18 17:20  75      


 


1/18/18 17:15  83      


 


1/18/18 17:10  75      


 


1/18/18 17:05  86  115/70 (85)    


 


1/18/18 17:05  72      


 


1/18/18 17:00  84      


 


1/18/18 16:55  85  96/67 (77)    


 


1/18/18 16:55  82      


 


1/18/18 16:51  74  114/62 (79)    


 


1/18/18 16:50  90      


 


1/18/18 16:45  75      


 


1/18/18 16:45  87  117/64 (81)    


 


1/18/18 16:40  85  111/66 (81)    


 


1/18/18 16:40  85      


 


1/18/18 16:35  95  111/69 (83)    


 


1/18/18 16:35  93      


 


1/18/18 16:30  87  115/70 (85)    


 


1/18/18 16:30  88      


 


1/18/18 16:25  86  117/73 (88)    


 


1/18/18 16:25  90      


 


1/18/18 16:20  85  106/60 (75)    


 


1/18/18 16:20  95      


 


1/18/18 16:19  84  110/73 (85)    


 


1/18/18 16:15  89      


 


1/18/18 16:05   18     


 


1/18/18 16:00  83  111/71 (84)    


 


1/18/18 15:30  88  114/74 (87)    


 


1/18/18 15:00  94  109/74 (86)    


 


1/18/18 14:30  85  111/75 (87)    


 


1/18/18 14:00  82  114/69 (84)    


 


1/18/18 13:43 98.7  18     


 


1/18/18 13:30  85  113/71 (85)    


 


1/18/18 13:00  86  111/68 (82)    


 


1/18/18 12:30  95  114/75 (88)    


 


1/18/18 12:00  89  110/67 (81)    


 


1/18/18 11:30  93  107/73 (84)    


 


1/18/18 10:47  86  117/69 (85)    








Objective Remarks


GENERAL: Well-nourished, well-developed patient.





ABDOMEN/GI: Abdomen soft, non-tender. 


   Fundus: Firm, non-tender at umbilicus.


GENITOURINARY: Light to moderate bleeding.


EXTREMITIES: No cyanosis or edema, non-tender, without signs of DVT.


Medications and IVs





Current Medications








 Medications


  (Trade)  Dose


 Ordered  Sig/Junaid


 Route  Start Time


 Stop Time Status Last Admin


 


  (NS Flush)  2 ml  BID


 IV FLUSH  1/18/18 21:00


    1/18/18 21:11


 


 


  (NS Flush)  2 ml  UNSCH  PRN


 IV FLUSH  1/18/18 20:45


     


 


 


  (Tylenol)  650 mg  Q4H  PRN


 PO  1/18/18 20:45


    1/19/18 05:15


 


 


  (Motrin)  800 mg  Q8HR  PRN


 PO  1/18/18 22:00


    1/18/18 22:22


 


 


  (Percocet  5-325


 Mg)  1 tab  Q4H  PRN


 PO  1/18/18 20:45


     


 


 


  (Percocet  5-325


 Mg)  2 tab  Q4H  PRN


 PO  1/18/18 20:45


     


 


 


  (Americaine 20%


 Top Spr)  1 spray  Q4H  PRN


 TOPICAL  1/18/18 20:45


     


 


 


  (Tucks Pads)  1 applic  QID  PRN


 TOPICAL  1/18/18 20:45


     


 


 


  (Marian-Colace)  2 tab  Q12HR  PRN


 PO  1/18/18 21:00


     


 


 


  (Ambien)  5 mg  HS  PRN


 PO  1/18/18 21:00


     


 


 


  (Mag-Al Plus


 Susp Liq)  15 ml  Q8H  PRN


 PO  1/18/18 20:45


     


 


 


  (Zofran  Odt)  4 mg  Q6H  PRN


 PO  1/18/18 20:45


     


 


 


 Miscellaneous


 Information  No systemic


 narcotics to be


 given except...  UNSCH  PRN


 .XX  1/18/18 16:00


 1/19/18 15:59   


 


 


 Miscellaneous


 Information  DO NOT


 ADMINISTER ANY


 ANTICOAGUL...  UNSCH  PRN


 .XX  1/18/18 16:00


 1/19/18 15:59   


 


 


 Fentanyl/


 Bupivacaine HCl  100 ml @ 0


 mls/hr  TITRATE


 EPIDURAL  1/18/18 16:00


     


 


 


  (ePHEDrine/NS 25


 MG/5 ML SYR)  10 mg  UNSCH  PRN


 IV PUSH  1/18/18 22:00


 1/19/18 21:59   


 











Assessment/Plan


Problem List:  


(1) 39 weeks gestation of pregnancy


ICD Codes:  Z3A.39 - 39 weeks gestation of pregnancy


(2) Spontaneous vaginal delivery


ICD Codes:  O80 - Encounter for full-term uncomplicated delivery











Tan Lopez MD Jan 19, 2018 08:05

## 2018-01-20 VITALS
SYSTOLIC BLOOD PRESSURE: 105 MMHG | HEART RATE: 94 BPM | TEMPERATURE: 97.6 F | DIASTOLIC BLOOD PRESSURE: 64 MMHG | RESPIRATION RATE: 16 BRPM | OXYGEN SATURATION: 99 %

## 2018-01-20 RX ADMIN — IBUPROFEN PRN MG: 800 TABLET, FILM COATED ORAL at 01:57

## 2018-01-20 RX ADMIN — Medication SCH ML: at 09:20

## 2018-01-20 NOTE — HHI.DS
Admission Date


2018 at 10:31


Discharge Date:  2018


Admitting Diagnosis


labor at term


Diagnosis:  


Delivery Date:  2018


Vaginal Delivery:  Normal


Infant:  Male, Single


Brief History


26y/o , IUP at 39.2





PNC uncomplicated per patient report





Patient presents c/o gush of clear fluid at 8:30am followed by another gush of 

fluid after arrival in the parking lot and continues to leak since arriving. 

She reports irregular contractions but denies regular or painful retractions. 

She reports good FM. She denies any VB. She has no other complaints or concerns 

today.


Pt Condition on Discharge:  Good


Discharge Disposition:  Discharge Home


Discharge Instructions


Diet Instructions:  As Tolerated, No Restrictions


Activities You Can Perform:  Pelvic Rest











Melanie Rey MD 2018 15:00

## 2018-01-20 NOTE — HHI.DCPOC
Discharge Care Plan








Your Health Problems Are: Vaginal delivery








Report Symptoms to Your Doctor


-Temperature above 100.5 degrees


-Redness, of incision or excessive or foul smelling drainage


-Unusual pain or calf pain


-Increased vaginal bleeding


-Painful or difficulty urinating


-Feelings of extreme sadness or anxiety after 2 weeks


Goals to Promote Your Health


* To prevent worsening of your condition and complications


* To maintain your health at the optimal level


Directions to Meet Your Goals


*** Take your medications as prescribed


*** Follow your dietary instruction


*** Follow activity as directed


*** Ensure plenty of rest for recovery


*** Drink fluids for hydration








*** Keep your appointments as scheduled


*** Take your immunizations and boosters as scheduled


*** If your symptoms worsen call your PCP, if no PCP go to Urgent Care Center 

or Emergency Room***


*** Smoking is Dangerous to Your Health. Avoid second hand smoke***


***Call the 24-hour crisis hotline for domestic abuse at 1-517.301.3646***











Melanie Rey MD Jan 20, 2018 14:59